# Patient Record
Sex: FEMALE | Race: BLACK OR AFRICAN AMERICAN | NOT HISPANIC OR LATINO | URBAN - METROPOLITAN AREA
[De-identification: names, ages, dates, MRNs, and addresses within clinical notes are randomized per-mention and may not be internally consistent; named-entity substitution may affect disease eponyms.]

---

## 2018-06-11 ENCOUNTER — EMERGENCY (EMERGENCY)
Facility: HOSPITAL | Age: 23
LOS: 0 days | Discharge: HOME | End: 2018-06-11
Admitting: PHYSICIAN ASSISTANT

## 2018-06-11 VITALS
TEMPERATURE: 100 F | DIASTOLIC BLOOD PRESSURE: 89 MMHG | SYSTOLIC BLOOD PRESSURE: 140 MMHG | HEART RATE: 78 BPM | RESPIRATION RATE: 18 BRPM | OXYGEN SATURATION: 97 %

## 2018-06-11 DIAGNOSIS — J45.909 UNSPECIFIED ASTHMA, UNCOMPLICATED: ICD-10-CM

## 2018-06-11 DIAGNOSIS — H57.8 OTHER SPECIFIED DISORDERS OF EYE AND ADNEXA: ICD-10-CM

## 2018-06-11 DIAGNOSIS — T15.90XA FOREIGN BODY ON EXTERNAL EYE, PART UNSPECIFIED, UNSPECIFIED EYE, INITIAL ENCOUNTER: ICD-10-CM

## 2018-06-11 NOTE — ED PROVIDER NOTE - OBJECTIVE STATEMENT
22 y.o female presents to the ED for evaluation of FB sensation of right eye.  After dropping glass on the floor, she wiped her eye and felt a FB sensation in her eye prompting visit to the ED. Pt denies changes in vision, headache, dizziness.  Pt did not wash her eye at home.  no further complaints at this time.  no lacerations to hands

## 2018-06-11 NOTE — ED PROVIDER NOTE - PROGRESS NOTE DETAILS
I supervised PA fellow care no FB on exam.  Discussed exam results with pt.  All questions were answered and return precautions discussed.  will follow up with PMD and optho.  no further complaints at this time. feels improved after suing eye was station for 10 minutes.

## 2018-06-11 NOTE — ED PROVIDER NOTE - NS ED ROS FT
CONST: No fever, chills or bodyaches  EYES: + FB sensation of right eye. No pain, redness, drainage or visual changes.  ENT: No ear pain or discharge, nasal discharge or congestion. No sore throat  CARD: No chest pain, palpitations  RESP: No SOB, cough  SKIN: No rashes

## 2018-06-11 NOTE — ED ADULT NURSE NOTE - OBJECTIVE STATEMENT
Patient states she was cleaning broken glass off of floor, rubbed her right eye, thinks she may have glass in eye

## 2018-06-11 NOTE — ED PROVIDER NOTE - PHYSICAL EXAMINATION
CONST: Well appearing in NAD  EYES: PERRL, EOMI, no superficial abrasion with fluorescin staining, visual acuity 20/25 both eyes, 20/25 L eye, 20/25 right eye, Sclera and conjunctiva clear, no FB appreciated, peripheral vision equal to or greater than examiner  ENT: No nasal discharge. TM's clear B/L without drainage. Oropharynx normal appearing, no erythema or exudates. Uvula midline.  CARD: Normal S1 S2; Normal rate and rhythm  RESP: Equal BS B/L, No wheezes, rhonchi or rales. No distress  SKIN: Warm, dry, no acute rashes. Good turgor

## 2019-03-16 ENCOUNTER — EMERGENCY (EMERGENCY)
Facility: HOSPITAL | Age: 24
LOS: 0 days | Discharge: HOME | End: 2019-03-16
Attending: EMERGENCY MEDICINE | Admitting: EMERGENCY MEDICINE

## 2019-03-16 VITALS
OXYGEN SATURATION: 98 % | SYSTOLIC BLOOD PRESSURE: 160 MMHG | RESPIRATION RATE: 18 BRPM | TEMPERATURE: 98 F | DIASTOLIC BLOOD PRESSURE: 86 MMHG | HEART RATE: 69 BPM

## 2019-03-16 DIAGNOSIS — J02.9 ACUTE PHARYNGITIS, UNSPECIFIED: ICD-10-CM

## 2019-03-16 DIAGNOSIS — H92.03 OTALGIA, BILATERAL: ICD-10-CM

## 2019-03-16 DIAGNOSIS — Z3A.15 15 WEEKS GESTATION OF PREGNANCY: ICD-10-CM

## 2019-03-16 DIAGNOSIS — O99.512 DISEASES OF THE RESPIRATORY SYSTEM COMPLICATING PREGNANCY, SECOND TRIMESTER: ICD-10-CM

## 2019-03-16 PROBLEM — J45.909 UNSPECIFIED ASTHMA, UNCOMPLICATED: Chronic | Status: ACTIVE | Noted: 2018-06-11

## 2019-03-16 RX ORDER — CARBAMIDE PEROXIDE 81.86 MG/ML
5 SOLUTION/ DROPS AURICULAR (OTIC)
Qty: 15 | Refills: 0 | OUTPATIENT
Start: 2019-03-16 | End: 2019-03-20

## 2019-03-16 NOTE — ED PROVIDER NOTE - ATTENDING CONTRIBUTION TO CARE
Pt is here for ear ache, back pain. Recent URI and coughing. Sent from Urgent care for Saturations that vary from 89-99. Pt noted to have thick nail polish on. Hx of asthma and since URI has been feeling a little shortness of breath. pt additionally is pregnant 15 weeks. Was on Zithromax for the last several days. On exam S1S2 rrr, lungs clear, abdomen soft nontender, + bilateral back tenderness mid and lower. Normal neuro. NO leg swelling or tenderness.

## 2019-03-16 NOTE — ED PROVIDER NOTE - PHYSICAL EXAMINATION
VITAL SIGNS: I have reviewed nursing notes and confirm.  CONSTITUTIONAL: Well-developed; well-nourished; in no acute distress.   SKIN: skin exam is warm and dry, no acute rash.    HEAD: Normocephalic; atraumatic.  EYES: conjunctiva and sclera clear.  ENT: excessive cerumen in both ear canals No nasal discharge; airway clear.  CARD: S1, S2 normal; no murmurs, gallops, or rubs. Regular rate and rhythm.   RESP: No wheezes, rales or rhonchi.  ABD: Normal bowel sounds; soft; non-distended; non-tender  EXT: Normal ROM.  No clubbing, cyanosis or edema.   NEURO: Alert, oriented, grossly unremarkable

## 2019-03-16 NOTE — ED PROVIDER NOTE - NS ED ROS FT
Eyes:  No visual changes, eye pain or discharge.  ENMT:  + ear pain, No hearing changes, pain, discharge or infections. No neck pain or stiffness.  Cardiac:  No chest pain, SOB or edema  Respiratory:  + cough, No respiratory distress. No hemoptysis. No history of asthma or RAD.  GI:  No nausea, vomiting, diarrhea or abdominal pain.  MS:  No myalgia, muscle weakness, joint pain or back pain.  Neuro:  No headache or weakness.  No LOC.  Skin:  No skin rash.   Endocrine: No history of thyroid disease or diabetes.  Except as documented in the HPI,  all other systems are negative.

## 2019-03-16 NOTE — ED ADULT NURSE NOTE - OBJECTIVE STATEMENT
patient c/o b/l ear pain and cough for 3 days. denies fevers/chills. also states she is 15 weeks pregnant. denies vaginal bleeding

## 2019-03-16 NOTE — ED PROVIDER NOTE - CARE PROVIDER_API CALL
Geno Mayen)  Otolaryngology  [address] [place] [address], [address] [place] [address]  Phone: [phone]  Fax: [phone]  Follow Up Time:

## 2019-03-16 NOTE — ED PROVIDER NOTE - OBJECTIVE STATEMENT
Pt is a 22y/o female with no sig pmhx currently 15 weeks gestation present today for eval of bilateral ear pain x 1 week with associated sinus congestion and sore throat. Pt denies fever, chills, weakness, n/v/d, abd pain, CP, SOB, Leg swelling, hemoptysis.

## 2024-12-05 PROBLEM — Z00.00 ENCOUNTER FOR PREVENTIVE HEALTH EXAMINATION: Status: ACTIVE | Noted: 2024-12-05

## 2024-12-13 ENCOUNTER — APPOINTMENT (OUTPATIENT)
Dept: SURGERY | Facility: CLINIC | Age: 29
End: 2024-12-13

## 2025-08-01 ENCOUNTER — APPOINTMENT (OUTPATIENT)
Dept: SURGERY | Facility: CLINIC | Age: 30
End: 2025-08-01
Payer: MEDICAID

## 2025-08-01 VITALS — HEIGHT: 71 IN | WEIGHT: 219 LBS | BODY MASS INDEX: 30.66 KG/M2

## 2025-08-01 DIAGNOSIS — Z88.0 ALLERGY STATUS TO PENICILLIN: ICD-10-CM

## 2025-08-01 DIAGNOSIS — L72.0 EPIDERMAL CYST: ICD-10-CM

## 2025-08-01 DIAGNOSIS — F41.1 GENERALIZED ANXIETY DISORDER: ICD-10-CM

## 2025-08-01 PROCEDURE — 21011 EXC FACE LES SC <2 CM: CPT

## 2025-08-01 PROCEDURE — 99203 OFFICE O/P NEW LOW 30 MIN: CPT | Mod: 57

## 2025-08-06 ENCOUNTER — APPOINTMENT (OUTPATIENT)
Dept: SURGERY | Facility: CLINIC | Age: 30
End: 2025-08-06

## 2025-08-15 LAB — CORE LAB BIOPSY: NORMAL

## 2025-08-29 ENCOUNTER — APPOINTMENT (OUTPATIENT)
Dept: SURGERY | Facility: CLINIC | Age: 30
End: 2025-08-29